# Patient Record
Sex: FEMALE | URBAN - METROPOLITAN AREA
[De-identification: names, ages, dates, MRNs, and addresses within clinical notes are randomized per-mention and may not be internally consistent; named-entity substitution may affect disease eponyms.]

---

## 2023-02-09 ENCOUNTER — WEB ENCOUNTER (OUTPATIENT)
Dept: URBAN - METROPOLITAN AREA CLINIC 27 | Facility: CLINIC | Age: 33
End: 2023-02-09

## 2023-02-09 ENCOUNTER — OFFICE VISIT (OUTPATIENT)
Dept: URBAN - METROPOLITAN AREA CLINIC 27 | Facility: CLINIC | Age: 33
End: 2023-02-09
Payer: COMMERCIAL

## 2023-02-09 ENCOUNTER — DASHBOARD ENCOUNTERS (OUTPATIENT)
Age: 33
End: 2023-02-09

## 2023-02-09 VITALS
HEIGHT: 63 IN | WEIGHT: 156 LBS | SYSTOLIC BLOOD PRESSURE: 125 MMHG | HEART RATE: 72 BPM | DIASTOLIC BLOOD PRESSURE: 97 MMHG | BODY MASS INDEX: 27.64 KG/M2

## 2023-02-09 DIAGNOSIS — K52.9 CHRONIC DIARRHEA: ICD-10-CM

## 2023-02-09 PROCEDURE — 99204 OFFICE O/P NEW MOD 45 MIN: CPT | Performed by: INTERNAL MEDICINE

## 2023-02-09 RX ORDER — DICYCLOMINE HYDROCHLORIDE 10 MG/1
1-2 CAPSULES CAPSULE ORAL
Qty: 90 | Refills: 3 | OUTPATIENT
Start: 2023-02-09 | End: 2023-06-09

## 2023-02-09 NOTE — HPI-TODAY'S VISIT:
32-year-old female here for diarrhea, abdominal pain.  She has had some intermittent crampy abdominal pain for years, worse with dairy products.  However over the last 2 months, she has had worsening diarrhea and lower abdominal pain despite being dairy free.  She did go to [place] for about 3 weeks, but symptoms did not start until about 2 weeks after returning from [place].  She had about 3-4 loose bowel movements per day.  No blood or mucus in the stool.  No nausea or vomiting.  No fevers or chills.  Has not tried any medication for it other than she was given azithromycin by urgent care which did not help symptoms.  No antibiotic use prior to onset of symptoms.  No family history of IBD or celiac disease.